# Patient Record
Sex: MALE | Race: WHITE | Employment: UNEMPLOYED | URBAN - METROPOLITAN AREA
[De-identification: names, ages, dates, MRNs, and addresses within clinical notes are randomized per-mention and may not be internally consistent; named-entity substitution may affect disease eponyms.]

---

## 2018-03-05 ENCOUNTER — OFFICE VISIT (OUTPATIENT)
Dept: ENT CLINIC | Age: 68
End: 2018-03-05

## 2018-03-05 VITALS
HEART RATE: 91 BPM | HEIGHT: 69 IN | DIASTOLIC BLOOD PRESSURE: 81 MMHG | WEIGHT: 195.2 LBS | SYSTOLIC BLOOD PRESSURE: 139 MMHG | BODY MASS INDEX: 28.91 KG/M2

## 2018-03-05 DIAGNOSIS — R42 DISEQUILIBRIUM: Primary | Chronic | ICD-10-CM

## 2018-03-05 DIAGNOSIS — H93.13 SUBJECTIVE TINNITUS OF BOTH EARS: Chronic | ICD-10-CM

## 2018-03-05 PROCEDURE — G8484 FLU IMMUNIZE NO ADMIN: HCPCS | Performed by: OTOLARYNGOLOGY

## 2018-03-05 PROCEDURE — 3017F COLORECTAL CA SCREEN DOC REV: CPT | Performed by: OTOLARYNGOLOGY

## 2018-03-05 PROCEDURE — G8427 DOCREV CUR MEDS BY ELIG CLIN: HCPCS | Performed by: OTOLARYNGOLOGY

## 2018-03-05 PROCEDURE — 4040F PNEUMOC VAC/ADMIN/RCVD: CPT | Performed by: OTOLARYNGOLOGY

## 2018-03-05 PROCEDURE — 99204 OFFICE O/P NEW MOD 45 MIN: CPT | Performed by: OTOLARYNGOLOGY

## 2018-03-05 PROCEDURE — G8419 CALC BMI OUT NRM PARAM NOF/U: HCPCS | Performed by: OTOLARYNGOLOGY

## 2018-03-05 RX ORDER — SIMVASTATIN 80 MG
TABLET ORAL
COMMUNITY
Start: 2018-02-16

## 2018-03-05 RX ORDER — LAMOTRIGINE 200 MG/1
TABLET ORAL
COMMUNITY
Start: 2018-01-16

## 2018-03-05 RX ORDER — OMEPRAZOLE 40 MG/1
40 CAPSULE, DELAYED RELEASE ORAL
COMMUNITY
Start: 2018-01-30 | End: 2018-08-17

## 2018-03-05 RX ORDER — MIRTAZAPINE 30 MG/1
30 TABLET, FILM COATED ORAL
COMMUNITY
Start: 2018-01-16

## 2018-03-05 RX ORDER — LORAZEPAM 1 MG/1
1 TABLET ORAL
COMMUNITY

## 2018-03-05 RX ORDER — LORAZEPAM 1 MG/1
1 TABLET ORAL DAILY
COMMUNITY
End: 2018-03-05

## 2018-03-05 NOTE — PATIENT INSTRUCTIONS
before testing. · No coffee, tea, or cola after midnight on the day of testing. · No alcohol containing beverages or liquid medication containing alcohol for 48 hours before the testing. · Someone will need to drive you home after the ENG testing. Do not drive an automobile for two hours after the ENG testing. · Wear comfortable loose fitting clothing on the day of testing.

## 2018-03-05 NOTE — PROGRESS NOTES
[facility] ENT       NEW PATIENT VISIT    PCP:  EDMOND ALMENDAREZ    REFERRED BY:  EDMOND ALMENDAREZ      CHIEF COMPLAINT:  Chief Complaint   Patient presents with    Dizziness       HISTORY OF PRESENT ILLNESS:     (Location, Quality, Severity, Duration, Timing, Context, Modifying factors, Associated symptoms)    Nadine Cote is a 69 y.o. male referred by Dr. Praful Mccarty for ENT consultation and evaluation of a problem located in the ears or head. The quality is dizziness. It is at a severity level of mild, and has been constant, for about 6 months. He describes his dizziness as a sensation where he will feel unsecure and unsteady, like I'm off balance. \"  He feels dizzy when he is going up, and sometimes down, steps/stairs. He feels the dizziness when \"moving too much or quickly. \"  He feels dizzy when trying to play tennis. He denied any actual falls. He stated that he is \"not playing tennis any more due to the uncertainty. I dont want to take a chance. \"  He stated that he does not feel the room or environment spinning, any sense of whirling, spinning, or movement. He stated that he does not feel dizzy when lying in bed and turning onto the right or left side. He has noticed no specific precipitating factors. He stated the dizziness is \"better if sit down. \"  He has noticed no change in hearing or tinnitus with dizziness. He denied any recent head injury. He denied any history of ear surgery. REVIEW OF SYSTEMS:      CONSTITUTIONAL:  Denied fever. Denied unexplained weight loss. EYES:   (+) He reported \"a burning sensation in both eyes when I closed them\", for about 3 weeks. Denied double vision and actual eye or orbital pain. EARS, NOSE, THROAT:  Denied otorrhea, otalgia, hearing loss, tinnitus, epistaxis, nasal dyspnea, rhinorrhea, sore throat and chronic hoarseness. CARDIOVASCULAR:  Denied chest pains. Denied syncope. RESPIRATORY:  Denied dyspnea.   Denied facility-administered medications for this visit. EXAMINATION, COMPREHENSIVE:       Constitutional:  · VITALS SIGNS reviewed. Vitals:    03/05/18 1646 03/05/18 1658   BP: (!) 143/82 139/81   Pulse: 96 91   Weight: 195 lb 3.2 oz (88.5 kg)    Height: 5' 9\" (1.753 m)       · GENERAL APPEARANCE:  Well developed, well nourished, no apparent distress, no apparent deformities. · ABILITY TO COMMUNICATE/QUALITY OF VOICE:  Communicated without difficulty. Normal voice. Head and Face:  · INSPECTION:  Normocephalic. No evidence of trauma. No tenderness. Normal overall appearance with no scars, lesions or masses. · SINUSES:  The maxillary and frontal sinuses were nontender, bilaterally. · SALIVARY GLANDS:  Parotid, submandibular and sublingual glands normal.  · FACIAL STRENGTH, MOTION:  Normal and equal for all five branches bilaterally. Eyes:   · EXTRAOCULAR MOTION:  intact, normal primary gaze alignment. No nystagmus at any point of gaze. Ears, nose, mouth, & throat:  · HEARING ASSESSMENT:  Able to hear finger rub bilaterally. Tuning fork tests, 512 Hertz tuning fork:  Clancy midline. Rinne air > bone bilaterally. · EARS,  OTOSCOPY:  The TMs and EACs appeared to be normal bilaterally. · EXTERNAL EAR/NOSE:  Normal pinnae and mastoids. Normal external nose. · NOSE:  The nasal septum was midline. The nasal mucosa, inferior turbinates, and secretions were normal.  No pus or polyps were seen. · LIPS, TEETH AND GUMS:  Normal.  · (+) OROPHARYNX/ORAL CAVITY: Post tonsillectomy. Oral mucosa, hard and soft palates, tongue, and pharynx were normal.  · INSPECTION OF PHARYNGEAL WALLS AND PYRIFORM SINUSES:  Normal bilaterally, with no pooling of saliva, masses, asymmetry or lesions.   · INDIRECT LARYNGOSCOPY, MIRROR EXAMINATION:  The epiglottis, false vocal cords, true vocal cords, mobility of the larynx, supraglottis, piriform sinuses, and base of tongue appeared normal.  · NASOPHARYNX, MIRROR

## 2018-03-13 ENCOUNTER — OFFICE VISIT (OUTPATIENT)
Dept: ENT CLINIC | Age: 68
End: 2018-03-13

## 2018-03-13 DIAGNOSIS — R42 DIZZINESS AND GIDDINESS: Primary | ICD-10-CM

## 2018-03-13 PROCEDURE — 92550 TYMPANOMETRY & REFLEX THRESH: CPT | Performed by: AUDIOLOGIST

## 2018-03-13 PROCEDURE — 92557 COMPREHENSIVE HEARING TEST: CPT | Performed by: AUDIOLOGIST

## 2018-03-13 PROCEDURE — 92537 CALORIC VSTBLR TEST W/REC: CPT | Performed by: AUDIOLOGIST

## 2018-03-13 PROCEDURE — 92540 BASIC VESTIBULAR EVALUATION: CPT | Performed by: AUDIOLOGIST

## 2018-03-13 PROCEDURE — 92547 SUPPLEMENTAL ELECTRICAL TEST: CPT | Performed by: AUDIOLOGIST

## 2018-03-14 ENCOUNTER — OFFICE VISIT (OUTPATIENT)
Dept: ENT CLINIC | Age: 68
End: 2018-03-14

## 2018-03-14 VITALS — DIASTOLIC BLOOD PRESSURE: 75 MMHG | SYSTOLIC BLOOD PRESSURE: 129 MMHG | HEART RATE: 101 BPM

## 2018-03-14 DIAGNOSIS — H93.13 SUBJECTIVE TINNITUS OF BOTH EARS: ICD-10-CM

## 2018-03-14 DIAGNOSIS — R42 DISEQUILIBRIUM: Primary | ICD-10-CM

## 2018-03-14 PROCEDURE — G8419 CALC BMI OUT NRM PARAM NOF/U: HCPCS | Performed by: OTOLARYNGOLOGY

## 2018-03-14 PROCEDURE — G8484 FLU IMMUNIZE NO ADMIN: HCPCS | Performed by: OTOLARYNGOLOGY

## 2018-03-14 PROCEDURE — 1123F ACP DISCUSS/DSCN MKR DOCD: CPT | Performed by: OTOLARYNGOLOGY

## 2018-03-14 PROCEDURE — G8427 DOCREV CUR MEDS BY ELIG CLIN: HCPCS | Performed by: OTOLARYNGOLOGY

## 2018-03-14 PROCEDURE — 1036F TOBACCO NON-USER: CPT | Performed by: OTOLARYNGOLOGY

## 2018-03-14 PROCEDURE — 3017F COLORECTAL CA SCREEN DOC REV: CPT | Performed by: OTOLARYNGOLOGY

## 2018-03-14 PROCEDURE — 4040F PNEUMOC VAC/ADMIN/RCVD: CPT | Performed by: OTOLARYNGOLOGY

## 2018-03-14 PROCEDURE — 99214 OFFICE O/P EST MOD 30 MIN: CPT | Performed by: OTOLARYNGOLOGY

## 2018-03-14 NOTE — PATIENT INSTRUCTIONS
at least 30 seconds or until the vertigo goes away. ¨ Sit up. If this causes vertigo, wait for it to stop. ¨ Repeat the procedure on the other side. ¨ Repeat this 10 times. Do these exercises 2 times a day until the vertigo is gone. When should you call for help? Call 911 anytime you think you may need emergency care. For example, call if:  ? · You have symptoms of a stroke. These may include:  ¨ Sudden numbness, tingling, weakness, or loss of movement in your face, arm, or leg, especially on only one side of your body. ¨ Sudden vision changes. ¨ Sudden trouble speaking. ¨ Sudden confusion or trouble understanding simple statements. ¨ Sudden problems with walking or balance. ¨ A sudden, severe headache that is different from past headaches. ?Call your doctor now or seek immediate medical care if:  ? · You have new or worse nausea and vomiting. ? · You have new symptoms such as hearing loss or roaring in your ears. ? Watch closely for changes in your health, and be sure to contact your doctor if:  ? · You are not getting better as expected. ? · Your vertigo gets worse. Where can you learn more? Go to [email or web address] org and sign in to your [employer] account. Enter  in the Xquva box to learn more about \"Benign Paroxysmal Positional Vertigo (BPPV): Care Instructions. \"     If you do not have an account, please click on the \"Sign Up Now\" link. Current as of: May 12, 2017  Content Version: 11.5  © 6232-7959 [employer]. Care instructions adapted under license by [facility] [address]  If you have questions about a medical condition or this instruction, always ask your healthcare professional. [place] [address] any warranty or liability for your use of this information.       ====================================================    Patient Education        [place] Exercises for Vertigo: Care Instructions  Your Care Instructions  Simple

## 2018-03-14 NOTE — PROGRESS NOTES
[address] [place] [address] ENT         CHIEF COMPLAINT:  Chief Complaint   Patient presents with    Dizziness          AUDIOGRAM (3/13/2018): There was an asymmetric high-frequency sensorineural hearing loss at 3-8000 Hz, greater in the right ear at 3000 and 4000 Hz and greater in the left ear at 6000 Hz. Speech reception threshold, word recognition score, tympanogram, and acoustic reflexes were all normal bilaterally. See report for details. ENG (3/13/2018): Normal vestibular function. (See report for details)          COUNSELING:    The audiogram results were reviewed and discussed with Zafar Stein, whom I advised of the type of hearing loss, the probable etiology, possible associated impairment, need for noise protection and avoidance, possible benefits of amplification/hearing aid, etiology of tinnitus and treatment/coping measures including masking techniques, and need for annual and prn hearing tests. I advised him of the probable difficulty understanding speech in the presence of moderate to high volume background noise. The ENG results were reviewed and discussed with Zafar Stein, whom I advised of the significance of the ENG findings, consistent with normal vestibular function. I discussed the functions of the vestibular system. I discussed the possibility of intermittent vestibular dysfunction, for instance, as that seen with Ménière's disease. I discussed vestibular exercises, and possible benefits of vestibular rehabilitation therapy. I discussed activity and safety precautions, fall prevention and avoidance, and need for continued follow-up.   I discussed possible etiologies of dizziness including, but not limited to: Meniere's disease, vestibular neuronitis, acute labyrinthitis, idiopathic peripheral vestibular dysfunction, benign paroxysmal positional vertigo, autoimmune hearing loss and vestibular dysfunction, multiple sclerosis, vestibular schwannoma

## 2018-05-16 ENCOUNTER — OFFICE VISIT (OUTPATIENT)
Dept: ENT CLINIC | Age: 68
End: 2018-05-16

## 2018-05-16 VITALS — SYSTOLIC BLOOD PRESSURE: 128 MMHG | DIASTOLIC BLOOD PRESSURE: 78 MMHG | HEART RATE: 98 BPM

## 2018-05-16 DIAGNOSIS — R42 DISEQUILIBRIUM: Primary | ICD-10-CM

## 2018-05-16 PROCEDURE — 4040F PNEUMOC VAC/ADMIN/RCVD: CPT | Performed by: OTOLARYNGOLOGY

## 2018-05-16 PROCEDURE — 1036F TOBACCO NON-USER: CPT | Performed by: OTOLARYNGOLOGY

## 2018-05-16 PROCEDURE — 1123F ACP DISCUSS/DSCN MKR DOCD: CPT | Performed by: OTOLARYNGOLOGY

## 2018-05-16 PROCEDURE — 99213 OFFICE O/P EST LOW 20 MIN: CPT | Performed by: OTOLARYNGOLOGY

## 2018-05-16 PROCEDURE — G8419 CALC BMI OUT NRM PARAM NOF/U: HCPCS | Performed by: OTOLARYNGOLOGY

## 2018-05-16 PROCEDURE — 3017F COLORECTAL CA SCREEN DOC REV: CPT | Performed by: OTOLARYNGOLOGY

## 2018-05-16 PROCEDURE — G8427 DOCREV CUR MEDS BY ELIG CLIN: HCPCS | Performed by: OTOLARYNGOLOGY

## 2018-05-24 ENCOUNTER — HOSPITAL ENCOUNTER (OUTPATIENT)
Dept: PHYSICAL THERAPY | Age: 68
Discharge: HOME | End: 2018-05-31
Admitting: OTOLARYNGOLOGY

## 2018-06-01 ENCOUNTER — HOSPITAL ENCOUNTER (OUTPATIENT)
Dept: OTHER | Age: 68
Discharge: HOME | End: 2018-06-30
Attending: OTOLARYNGOLOGY | Admitting: OTOLARYNGOLOGY

## 2018-06-04 LAB
ACTIVATED CLOTTING TIME: 121 SEC (ref 99–130)
ACTIVATED CLOTTING TIME: 152 SEC (ref 99–130)
ACTIVATED CLOTTING TIME: 232 SEC (ref 99–130)
ACTIVATED CLOTTING TIME: 399 SEC (ref 99–130)
ACTIVATED CLOTTING TIME: 407 SEC (ref 99–130)
ACTIVATED CLOTTING TIME: 482 SEC (ref 99–130)
ACTIVATED CLOTTING TIME: 505 SEC (ref 99–130)
ACTIVATED CLOTTING TIME: 626 SEC (ref 99–130)
ACTIVATED CLOTTING TIME: 629 SEC (ref 99–130)
ACTIVATED CLOTTING TIME: 98 SEC (ref 99–130)

## 2018-07-01 ENCOUNTER — HOSPITAL ENCOUNTER (OUTPATIENT)
Dept: OTHER | Age: 68
Discharge: HOME | End: 2018-07-31
Attending: OTOLARYNGOLOGY | Admitting: OTOLARYNGOLOGY

## 2018-08-01 ENCOUNTER — HOSPITAL ENCOUNTER (OUTPATIENT)
Dept: OTHER | Age: 68
Discharge: HOME | End: 2018-08-31
Attending: OTOLARYNGOLOGY | Admitting: OTOLARYNGOLOGY

## 2018-08-17 ENCOUNTER — OFFICE VISIT (OUTPATIENT)
Dept: ENT CLINIC | Age: 68
End: 2018-08-17

## 2018-08-17 VITALS
SYSTOLIC BLOOD PRESSURE: 130 MMHG | WEIGHT: 197 LBS | HEIGHT: 69 IN | BODY MASS INDEX: 29.18 KG/M2 | HEART RATE: 98 BPM | DIASTOLIC BLOOD PRESSURE: 78 MMHG

## 2018-08-17 DIAGNOSIS — H93.13 SUBJECTIVE TINNITUS OF BOTH EARS: ICD-10-CM

## 2018-08-17 DIAGNOSIS — R42 DISEQUILIBRIUM: Primary | ICD-10-CM

## 2018-08-17 PROCEDURE — 3017F COLORECTAL CA SCREEN DOC REV: CPT | Performed by: OTOLARYNGOLOGY

## 2018-08-17 PROCEDURE — 1101F PT FALLS ASSESS-DOCD LE1/YR: CPT | Performed by: OTOLARYNGOLOGY

## 2018-08-17 PROCEDURE — 1036F TOBACCO NON-USER: CPT | Performed by: OTOLARYNGOLOGY

## 2018-08-17 PROCEDURE — 1123F ACP DISCUSS/DSCN MKR DOCD: CPT | Performed by: OTOLARYNGOLOGY

## 2018-08-17 PROCEDURE — G8419 CALC BMI OUT NRM PARAM NOF/U: HCPCS | Performed by: OTOLARYNGOLOGY

## 2018-08-17 PROCEDURE — G8427 DOCREV CUR MEDS BY ELIG CLIN: HCPCS | Performed by: OTOLARYNGOLOGY

## 2018-08-17 PROCEDURE — 4040F PNEUMOC VAC/ADMIN/RCVD: CPT | Performed by: OTOLARYNGOLOGY

## 2018-08-17 PROCEDURE — 99213 OFFICE O/P EST LOW 20 MIN: CPT | Performed by: OTOLARYNGOLOGY

## 2018-09-01 ENCOUNTER — HOSPITAL ENCOUNTER (OUTPATIENT)
Dept: OTHER | Age: 68
Discharge: HOME | End: 2018-09-01
Attending: OTOLARYNGOLOGY | Admitting: OTOLARYNGOLOGY

## 2019-09-19 DIAGNOSIS — D50.9 IRON DEFICIENCY ANEMIA, UNSPECIFIED IRON DEFICIENCY ANEMIA TYPE: ICD-10-CM

## 2019-09-19 DIAGNOSIS — D64.9 ANEMIA, UNSPECIFIED TYPE: ICD-10-CM

## 2019-09-19 RX ORDER — SODIUM CHLORIDE 9 MG/ML
INJECTION, SOLUTION INTRAVENOUS CONTINUOUS
Status: CANCELLED | OUTPATIENT
Start: 2019-09-19

## 2019-09-19 RX ORDER — SODIUM CHLORIDE 0.9 % (FLUSH) 0.9 %
10 SYRINGE (ML) INJECTION PRN
Status: CANCELLED | OUTPATIENT
Start: 2019-09-19

## 2019-09-19 RX ORDER — DIPHENHYDRAMINE HYDROCHLORIDE 50 MG/ML
50 INJECTION INTRAMUSCULAR; INTRAVENOUS ONCE
Status: CANCELLED | OUTPATIENT
Start: 2019-09-19

## 2019-09-19 RX ORDER — METHYLPREDNISOLONE SODIUM SUCCINATE 125 MG/2ML
125 INJECTION, POWDER, LYOPHILIZED, FOR SOLUTION INTRAMUSCULAR; INTRAVENOUS ONCE
Status: CANCELLED | OUTPATIENT
Start: 2019-09-19

## 2019-09-24 ENCOUNTER — HOSPITAL ENCOUNTER (OUTPATIENT)
Dept: ONCOLOGY | Age: 69
Setting detail: INFUSION SERIES
Discharge: HOME | End: 2019-09-24
Payer: MEDICARE

## 2019-09-24 VITALS
DIASTOLIC BLOOD PRESSURE: 70 MMHG | TEMPERATURE: 97.5 F | HEART RATE: 80 BPM | SYSTOLIC BLOOD PRESSURE: 130 MMHG | RESPIRATION RATE: 16 BRPM

## 2019-09-24 DIAGNOSIS — D64.9 ANEMIA, UNSPECIFIED TYPE: ICD-10-CM

## 2019-09-24 DIAGNOSIS — D50.9 IRON DEFICIENCY ANEMIA, UNSPECIFIED IRON DEFICIENCY ANEMIA TYPE: Primary | ICD-10-CM

## 2019-09-24 PROCEDURE — 2580000003 HC RX 258: Performed by: INTERNAL MEDICINE

## 2019-09-24 PROCEDURE — 6360000002 HC RX W HCPCS: Performed by: INTERNAL MEDICINE

## 2019-09-24 PROCEDURE — 96365 THER/PROPH/DIAG IV INF INIT: CPT

## 2019-09-24 RX ORDER — SODIUM CHLORIDE 9 MG/ML
INJECTION, SOLUTION INTRAVENOUS CONTINUOUS
Status: CANCELLED | OUTPATIENT
Start: 2019-10-01

## 2019-09-24 RX ORDER — DIPHENHYDRAMINE HYDROCHLORIDE 50 MG/ML
50 INJECTION INTRAMUSCULAR; INTRAVENOUS ONCE
Status: CANCELLED | OUTPATIENT
Start: 2019-10-01

## 2019-09-24 RX ORDER — SODIUM CHLORIDE 0.9 % (FLUSH) 0.9 %
10 SYRINGE (ML) INJECTION PRN
Status: CANCELLED | OUTPATIENT
Start: 2019-10-01

## 2019-09-24 RX ORDER — SODIUM CHLORIDE 9 MG/ML
INJECTION, SOLUTION INTRAVENOUS CONTINUOUS
Status: ACTIVE | OUTPATIENT
Start: 2019-09-24 | End: 2019-09-24

## 2019-09-24 RX ORDER — METHYLPREDNISOLONE SODIUM SUCCINATE 125 MG/2ML
125 INJECTION, POWDER, LYOPHILIZED, FOR SOLUTION INTRAMUSCULAR; INTRAVENOUS ONCE
Status: CANCELLED | OUTPATIENT
Start: 2019-10-01

## 2019-09-24 RX ORDER — SODIUM CHLORIDE 0.9 % (FLUSH) 0.9 %
10 SYRINGE (ML) INJECTION PRN
Status: DISCONTINUED | OUTPATIENT
Start: 2019-09-24 | End: 2019-09-25 | Stop reason: HOSPADM

## 2019-09-24 RX ADMIN — SODIUM CHLORIDE: 9 INJECTION, SOLUTION INTRAVENOUS at 08:20

## 2019-09-24 RX ADMIN — Medication 10 ML: at 08:21

## 2019-09-24 RX ADMIN — FERRIC CARBOXYMALTOSE INJECTION 750 MG: 50 INJECTION, SOLUTION INTRAVENOUS at 08:20

## 2019-10-01 ENCOUNTER — HOSPITAL ENCOUNTER (OUTPATIENT)
Dept: ONCOLOGY | Age: 69
Setting detail: INFUSION SERIES
Discharge: HOME | End: 2019-10-01
Payer: MEDICARE

## 2019-10-01 VITALS
SYSTOLIC BLOOD PRESSURE: 139 MMHG | HEART RATE: 79 BPM | RESPIRATION RATE: 16 BRPM | TEMPERATURE: 97.4 F | DIASTOLIC BLOOD PRESSURE: 68 MMHG

## 2019-10-01 DIAGNOSIS — D50.9 IRON DEFICIENCY ANEMIA, UNSPECIFIED IRON DEFICIENCY ANEMIA TYPE: Primary | ICD-10-CM

## 2019-10-01 DIAGNOSIS — D64.9 ANEMIA, UNSPECIFIED TYPE: ICD-10-CM

## 2019-10-01 PROCEDURE — 96365 THER/PROPH/DIAG IV INF INIT: CPT

## 2019-10-01 PROCEDURE — 2580000003 HC RX 258: Performed by: INTERNAL MEDICINE

## 2019-10-01 PROCEDURE — 6360000002 HC RX W HCPCS: Performed by: INTERNAL MEDICINE

## 2019-10-01 RX ORDER — METHYLPREDNISOLONE SODIUM SUCCINATE 125 MG/2ML
125 INJECTION, POWDER, LYOPHILIZED, FOR SOLUTION INTRAMUSCULAR; INTRAVENOUS
Status: DISCONTINUED | OUTPATIENT
Start: 2019-10-01 | End: 2019-10-01

## 2019-10-01 RX ORDER — SODIUM CHLORIDE 9 MG/ML
INJECTION, SOLUTION INTRAVENOUS CONTINUOUS
Status: CANCELLED | OUTPATIENT
Start: 2019-10-01

## 2019-10-01 RX ORDER — DIPHENHYDRAMINE HYDROCHLORIDE 50 MG/ML
50 INJECTION INTRAMUSCULAR; INTRAVENOUS ONCE
Status: CANCELLED | OUTPATIENT
Start: 2019-10-01

## 2019-10-01 RX ORDER — SODIUM CHLORIDE 9 MG/ML
INJECTION, SOLUTION INTRAVENOUS CONTINUOUS PRN
Status: DISCONTINUED | OUTPATIENT
Start: 2019-10-01 | End: 2019-10-01

## 2019-10-01 RX ORDER — METHYLPREDNISOLONE SODIUM SUCCINATE 125 MG/2ML
125 INJECTION, POWDER, LYOPHILIZED, FOR SOLUTION INTRAMUSCULAR; INTRAVENOUS ONCE
Status: CANCELLED | OUTPATIENT
Start: 2019-10-01

## 2019-10-01 RX ORDER — SODIUM CHLORIDE 9 MG/ML
INJECTION, SOLUTION INTRAVENOUS CONTINUOUS
Status: DISCONTINUED | OUTPATIENT
Start: 2019-10-01 | End: 2019-10-01

## 2019-10-01 RX ORDER — SODIUM CHLORIDE 0.9 % (FLUSH) 0.9 %
10 SYRINGE (ML) INJECTION PRN
Status: DISCONTINUED | OUTPATIENT
Start: 2019-10-01 | End: 2019-10-01

## 2019-10-01 RX ORDER — SODIUM CHLORIDE 0.9 % (FLUSH) 0.9 %
10 SYRINGE (ML) INJECTION PRN
Status: CANCELLED | OUTPATIENT
Start: 2019-10-01

## 2019-10-01 RX ORDER — DIPHENHYDRAMINE HYDROCHLORIDE 50 MG/ML
50 INJECTION INTRAMUSCULAR; INTRAVENOUS
Status: DISCONTINUED | OUTPATIENT
Start: 2019-10-01 | End: 2019-10-01

## 2019-10-01 RX ADMIN — Medication 10 ML: at 08:13

## 2019-10-01 RX ADMIN — FERRIC CARBOXYMALTOSE INJECTION 750 MG: 50 INJECTION, SOLUTION INTRAVENOUS at 08:12

## 2019-10-01 RX ADMIN — SODIUM CHLORIDE: 9 INJECTION, SOLUTION INTRAVENOUS at 08:12
